# Patient Record
Sex: MALE | Race: ASIAN | URBAN - METROPOLITAN AREA
[De-identification: names, ages, dates, MRNs, and addresses within clinical notes are randomized per-mention and may not be internally consistent; named-entity substitution may affect disease eponyms.]

---

## 2021-08-20 ENCOUNTER — TELEPHONE (OUTPATIENT)
Dept: INFECTIOUS DISEASES | Age: 71
End: 2021-08-20

## 2021-08-20 ENCOUNTER — VIRTUAL VISIT (OUTPATIENT)
Dept: INFECTIOUS DISEASES | Age: 71
End: 2021-08-20
Payer: MEDICARE

## 2021-08-20 DIAGNOSIS — D69.6 THROMBOCYTOPENIA (HCC): ICD-10-CM

## 2021-08-20 DIAGNOSIS — C79.9 METASTASIS FROM MALIGNANT NEOPLASM OF LEFT KIDNEY (HCC): ICD-10-CM

## 2021-08-20 DIAGNOSIS — K21.9 GASTROESOPHAGEAL REFLUX DISEASE WITHOUT ESOPHAGITIS: ICD-10-CM

## 2021-08-20 DIAGNOSIS — D63.0: ICD-10-CM

## 2021-08-20 DIAGNOSIS — R06.02 SHORTNESS OF BREATH: ICD-10-CM

## 2021-08-20 DIAGNOSIS — D84.9 IMMUNOCOMPROMISED (HCC): ICD-10-CM

## 2021-08-20 DIAGNOSIS — Z90.5 H/O PARTIAL NEPHRECTOMY: ICD-10-CM

## 2021-08-20 DIAGNOSIS — R53.83 MALAISE AND FATIGUE: ICD-10-CM

## 2021-08-20 DIAGNOSIS — J90 RECURRENT RIGHT PLEURAL EFFUSION: Primary | ICD-10-CM

## 2021-08-20 DIAGNOSIS — C64.2 RENAL CELL CARCINOMA OF LEFT KIDNEY (HCC): ICD-10-CM

## 2021-08-20 DIAGNOSIS — C64.2 METASTASIS FROM MALIGNANT NEOPLASM OF LEFT KIDNEY (HCC): ICD-10-CM

## 2021-08-20 DIAGNOSIS — C64.1: ICD-10-CM

## 2021-08-20 DIAGNOSIS — Z87.891 EX-SMOKER: ICD-10-CM

## 2021-08-20 DIAGNOSIS — K76.9 CHRONIC LIVER DISEASE: ICD-10-CM

## 2021-08-20 DIAGNOSIS — R53.81 MALAISE AND FATIGUE: ICD-10-CM

## 2021-08-20 PROCEDURE — 4040F PNEUMOC VAC/ADMIN/RCVD: CPT | Performed by: INTERNAL MEDICINE

## 2021-08-20 PROCEDURE — 1123F ACP DISCUSS/DSCN MKR DOCD: CPT | Performed by: INTERNAL MEDICINE

## 2021-08-20 PROCEDURE — 3017F COLORECTAL CA SCREEN DOC REV: CPT | Performed by: INTERNAL MEDICINE

## 2021-08-20 PROCEDURE — 99205 OFFICE O/P NEW HI 60 MIN: CPT | Performed by: INTERNAL MEDICINE

## 2021-08-20 PROCEDURE — G8427 DOCREV CUR MEDS BY ELIG CLIN: HCPCS | Performed by: INTERNAL MEDICINE

## 2021-08-20 RX ORDER — TORSEMIDE 10 MG/1
10 TABLET ORAL DAILY
COMMUNITY
Start: 2021-08-19 | End: 2021-09-02

## 2021-08-20 RX ORDER — ALBUTEROL SULFATE 90 UG/1
2 AEROSOL, METERED RESPIRATORY (INHALATION) 3 TIMES DAILY
COMMUNITY
Start: 2021-07-14

## 2021-08-20 RX ORDER — SPIRONOLACTONE 50 MG/1
50 TABLET, FILM COATED ORAL DAILY
COMMUNITY
Start: 2021-01-12 | End: 2021-09-02

## 2021-08-20 RX ORDER — AMOXICILLIN AND CLAVULANATE POTASSIUM 875; 125 MG/1; MG/1
1 TABLET, FILM COATED ORAL 2 TIMES DAILY
COMMUNITY
Start: 2021-08-19 | End: 2021-08-26

## 2021-08-20 ASSESSMENT — ENCOUNTER SYMPTOMS
SHORTNESS OF BREATH: 1
SORE THROAT: 0
EYE DISCHARGE: 0
WHEEZING: 0
CONSTIPATION: 0
COUGH: 0
DIARRHEA: 0
EYE REDNESS: 0
ABDOMINAL PAIN: 0
RHINORRHEA: 0
BACK PAIN: 0
NAUSEA: 0
TROUBLE SWALLOWING: 0

## 2021-08-20 NOTE — TELEPHONE ENCOUNTER
Received medical records and referral for Dr. Kiera Hoyt ( in media tab )   Patient scheduled next available, 9/2/21, is this date and time appropriate for this patient

## 2021-08-20 NOTE — PROGRESS NOTES
[facility]fectious Diseases [facility] NewPatient Note    Reason for Visit:               Urgent consultation for pneumonia  Primary Care Physician:  No primary care provider on file. History Obtained From:   Patient , Medical Records     CHIEF COMPLAINT:    Chief Complaint   Patient presents with    New Patient     bacterial pneumonia ? HISTORY OF PRESENT ILLNESS: Cesario Ahumada is a 78 y.o. pleasant male patient, who had a virtual visit with me today as a new patient. History was obtained from chart review and the patient. The patient had a virtual visit with me today for above mentioned complaints. The patient history of bilateral renal cell carcinoma and has undergone bilateral partial nephrectomies in the past.  He was found to have recurrence of the renal cell carcinoma in September 2020 on a CT scan that indicated lesions in the left kidney and left adrenal gland and he was started on Keytruda by his oncologist.    He had right-sided pleural effusion for which he underwent thoracentesis on 3/59/9698, which was complicated by iatrogenic pneumothorax. The patient has been on repeated courses of antibiotics. The patient was seen by nurse practitioner Domitila Delcid at [facility] yesterday. He reported feeling poorly and was feeling fatigued and tired. He was on levofloxacin, which she was not tolerating well. The patient was referred to me for an urgent evaluation for concern for pneumonia and was given an urgent video appointment today. Past Medical History:   Past medical  history wasreviewed by me during this visit in detail. No past medical history on file. Past Surgical History:  Past surgical history was reviewed by me during this visit in detail. No past surgical history on file. Current Medications:    All medicationswere reviewed by me during this visit  Current Outpatient Medications   Medication Sig Dispense Refill    spironolactone (ALDACTONE) 50 MG tablet Take 50 mg by mouth daily      amoxicillin-clavulanate (AUGMENTIN) 875-125 MG per tablet Take 1 tablet by mouth 2 times daily      torsemide (DEMADEX) 10 MG tablet Take 10 mg by mouth daily      albuterol sulfate  (90 Base) MCG/ACT inhaler Inhale 2 puffs into the lungs 3 times daily       No current facility-administered medications for this visit. Family history: All family history was reviewed by me today    No family history on file. No family history of immunocompromising or autoimmune conditions. No h/o TBin in family or contacts      REVIEW OF SYSTEMS:      Review of Systems   Constitutional: Positive for fatigue. Negative for chills, diaphoresis and fever. HENT: Negative for ear discharge, ear pain, rhinorrhea, sore throat and trouble swallowing. Eyes: Negative for discharge and redness. Respiratory: Positive for shortness of breath (Mild to moderate). Negative for cough and wheezing. Cardiovascular: Negative for chest pain and leg swelling. Gastrointestinal: Negative for abdominal pain, constipation, diarrhea and nausea. Endocrine: Negative for polyuria. Genitourinary: Negative for dysuria, flank pain, frequency, hematuria and urgency. Musculoskeletal: Negative for back pain and myalgias. Skin: Negative for rash. Neurological: Negative for dizziness, seizures and headaches. Hematological: Does not bruise/bleed easily. Psychiatric/Behavioral: Negative for hallucinations and suicidal ideas. All other systems reviewed and are negative. PHYSICAL EXAM:      There were no vitals filed for this visit. Physical Exam  Constitutional:       General: He is not in acute distress. Appearance: Normal appearance. He is not ill-appearing or toxic-appearing. Comments: The patient was seen today via virtual video visit    Thin built. HENT:      Head: Normocephalic.       Right Ear: External ear normal.      Left Ear: External ear normal. Nose: Nose normal.      Mouth/Throat:      Pharynx: No oropharyngeal exudate. Eyes:      General: No scleral icterus. Right eye: No discharge. Left eye: No discharge. Extraocular Movements: Extraocular movements intact. Pulmonary:      Effort: Pulmonary effort is normal.   Musculoskeletal:         General: No swelling. Normal range of motion. Cervical back: Normal range of motion. Skin:     Coloration: Skin is not jaundiced. Findings: No bruising or erythema. Neurological:      Mental Status: He is alert and oriented to person, place, and time. Mental status is at baseline. Gait: Gait normal.   Psychiatric:         Mood and Affect: Mood normal.         Behavior: Behavior normal.         Thought Content: Thought content normal.         Judgment: Judgment normal.               DATA:  All available lab data was reviewed by me during this visit    Last CBC:  No results found for: WBC, HGB, HCT, MCV, PLT, LABLYMP, MID, GRAN, LYMPHOPCT, MIDPERCENT, GRANULOCYTES, RBC, MCH, MCHC, RDW    Last BMP:  No results found for: NA, K, CL, CO2, BUN, CREATININE, GLUCOSE, CALCIUM     Hepatic Function Panel: No results found for: ALKPHOS, ALT, AST, PROT, BILITOT, BILIDIR, IBILI, LABALBU    Last CK: No results found for: CKTOTAL    Last ESR:  No results found for: SEDRATE    Last CRP:  No results found for: CRP    1. Imaging: All pertinent images and reports for the current visit were reviewed by me during this visit. Outside records:    Labs, Microbiology, Radiology and pertinentresults from Care everywhere, if available, were reviewed as a part of the consultation. Problem list:       There is no problem list on file for this patient.       Microbiology:       All available micro data was reviewed by me during this visit      · Right pleural fluid culture - collected on 7/28/2021 at [id number]: Negative    Right pleural fluid cytology: Collected on [employer] Services:     Active Member of Clubs or Organizations:     Attends Club or Organization Meetings:     Marital Status:    Intimate Partner Violence:     Fear of Current or Ex-Partner:     Emotionally Abused:     Physically Abused:     Sexually Abused:        COVID VACCINATION AND LAB RESULT RECORDS:     Internal Administration   First Dose      Second Dose           Last COVID Lab No results found for: SARS-COV-2, SARS-COV-2 RNA, SARS-COV-2, SARS-COV-2, SARS-COV-2 BY PCR, SARS-COV-2, SARS-COV-2, SARS-COV-2         IMPRESSION:    The patient is a 78 y. o.old male who  has no past medical history on file. was seen today for following problems:    1. Recurrent right pleural effusion    2. Renal cell carcinoma of left kidney (HCC)    3. 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 Immunocompromised ([facility]    5. Metastasis from malignant neoplasm of left kidney (HCC)    6. Malaise and fatigue    7. Shortness of breath    8. Chronic liver disease    9. Thrombocytopenia ([facility]    10. H/O partial nephrectomy    11. Gastroesophageal reflux disease without esophagitis    12. Ex-smoker        Discussion:      I reviewed patient's medical record in detail. He is currently retired and used to work as a manager at [employer]. He has quit smoking in 1976. The patient has history of metastatic neoplasm of right renal cell carcinoma and underwent right partial nephrectomy in September 2016. He was then diagnosed with a left-sided renal cell carcinoma and he underwent left partial nephrectomy in March 2019. 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     Patient has been on surveillance by oncology since then and had CT scans done in September 2020 which showed a mass in the lower pole of the left kidney concerning for recurrent disease as well as interval growth of a mass involving the left adrenal gland suggestive of metastatic involvement with several nodules in the left lower lobe. The patient was started on Keytruda every 3 weeks by oncology and has received 4 cycles of Keytruda since then. The patient was also planned to be started on axinitib but could not start that due to financial issues. The patient also has chronic liver issues and has chronic thrombocytopenia. The patient has right-sided pleural effusion underwent ultrasound-guided thoracentesis at [facility] on 7/28/2021 and 2.5 L of fluid was drained. His repeat chest x-ray on 7/29/2021 showed right-sided pneumothorax. The cytology was negative for malignancy and showed chronic inflammatory cells. The patient tells me that he was given a 10-day course of levofloxacin before the thoracentesis was done by Dr. Brea Beltrán and after thoracentesis, he was given another 7 days of levofloxacin. I reviewed reports of all of the imaging studies from outside hospitals as well as a culture and cytology results in detail. Patient's chest x-ray on 7/30/2021 had shown a small echogenic right-sided pneumothorax with a persistent right lower lobe opacity. However, there chest x-ray done on 8/11/2021 showed a reflection of moderate right-sided pleural effusion and the chest x-ray done yesterday at [facility] indicated persistent moderate right-sided pleural effusion with right to basal atelectasis/pneumonia. The pneumothorax had resolved on the x-ray done yesterday. The pleural fluid cytology from 7/28/2021 was negative for malignant cells and showed benign mesothelial cells and chronic inflammatory cells. The pleural fluid Gram stain on 7/28/2021 had shown few WBCs but no organisms and the culture remained negative. No AFB or fungal culture or cytology available for chemistry available from the paracentesis fluid from 7/28/2021    I am not sure if the right pleural effusion represented a parapneumonic effusion or empyema or it was just a reactive effusion.   Since the patient had received a 10-day course of levofloxacin before thoracentesis, it would have significantly compromised the culture yield     Recollection of the right pleural effusion is concerning more for reactive effusion. Possibility of malignant effusion still cannot be ruled out. The patient denies having any fevers or night sweats. He does have some shortness of breath issues on exertion. He tells me that he has been given a 1 week prescription of oral Augmentin by his PCP yesterday    RECOMMENDATIONS:      Diagnostic Workup:    · If the patient needs repeat thoracentesis on the right side, send pleural fluid for glucose, protein, LDH, cholesterol with corresponding serum values  · Also, recommended sending right-sided pleural effusion fluid for Gram stain and bacterial culture, AFB stain and culture and fungal stain and culture and pleural fluid cytology  · Continue to follow fever curve at home. Antimicrobials:    · My recommendation will be to not start the Augmentin and have advised him to watch his symptoms off antibiotics.   · If the patient starts having any fevers or night sweats or he starts having worsening respiratory symptoms, my recommendation will be doing blood cultures and also doing a repeat right-sided thoracentesis  · Giving him a Augmentin this time before a potential thoracentesis will again compromise the culture yield and likely will not be very helpful to know the underlying cause of the effusions, which may keep on regulating  · If repeat right-sided thoracentesis done, will recommend doing the labs mentioned above on the thoracentesis fluid  · Continue to watch for any worsening shortness of breath  · He was advised to contact me if he does get repeat thoracentesis for any antibiotic recommendations  · In the meanwhile, if the patient starts getting sicker or starts having weakness or dizziness episodes of high-grade fevers, I have recommended him to go to the ER for evaluation and thoracentesis  · Discussed with patient the strategies to stay safe from COVID 19 exposures including safe social distancing, frequent and proper hand hygiene when outside and using 3 layered mouth and nose coverings/facemasks when outside their home. Patient education and counseling:    · The patient was educated in detail about the side-effects of variousantibiotics and things to watch for like new rashes, lip swelling, severe reaction, worsening diarrhea, break through fever etc.  · Patient was instructed to stop antibiotics and callour office if these problems were to occur, or go to nearest ER if experiencing severe symptoms. · Discussed patient's condition and what to expect. All of the patient's questions wereaddressed in a satisfactory manner and patient verbalized understanding all instructions. Level of complexity of consult: High    Follow-up:    · Follow-up with me in ID clinic or through video visit as needed    Meet Ambriz is a 78 y.o. male being evaluated by a Virtual Visit encounter to address concerns as mentioned above. A caregiver was present when appropriate. Due to this being a TeleHealth encounter ([facility]), evaluation of the following organ systems was limited: Vitals/Constitutional/EENT/Resp/CV/GI//MS/Neuro/Skin/Heme-Lymph-Imm. Pursuant to the emergency declaration under the [address] [place] [address], [address] [place] [address] authority and the [employer] Act, this Virtual Visit was conducted with patient's (and/or legal guardian's) consent, to reduce the patient's risk of exposure to COVID-19 and provide necessary medical care. The patient (and/or legal guardian) has also been advised to contact this office for worsening conditions or problems, and seek emergency medical treatment and/or call 911 if deemed necessary.      Services were provided through an audio and/or video synchronous discussion virtually to substitute for in-person clinic visit. Patient and provider were located at their individual homes. --Wilson Thurman MD on 8/20/2021 at 4:24 PM    An electronic signature was used to authenticate this note. TIME SPENTTODAY:     - Spent over 62 minutes on visit (including interval history, physical exam, review of data including labs, cultures, imaging, development and implementation of treatmentplan and coordination of care). - Over 50% of time spent with pt counseling and education. Please note that this chart was generated using Dragon dictation software. Although every effort was made to ensure the accuracy of this automated transcription, some errors in transcription may have occurred inadvertently. If you may need any clarification, please do not hesitate to contact me through [employer] or at the phone number provided below with my electronic signature. Any pictures or media included in this note were obtained after taking informed verbal consent from the patient and with their approval to include those in the patient's medical record. Thankyou for involving me in the care of your patient. If you have any additional questions,please do not hesitate to contact me.     Wilson Thurman MD, MPH  8/20/21 , 3:29 PM EDT   [facility]   [address] [place] [address] [address] [facility], [address] [place] [address]  Office: [phone]  Fax: [phone]  Clinic days:  Tuesday & Thursday

## 2021-08-24 ENCOUNTER — TELEPHONE (OUTPATIENT)
Dept: INFECTIOUS DISEASES | Age: 71
End: 2021-08-24

## 2021-08-24 NOTE — TELEPHONE ENCOUNTER
I also recommend the following:    Pleural fluid  glucose, protein, LDH, cholesterol and fluid cell count with differential. Do the same labs from the blood. Please let them know. Thanks.

## 2021-08-24 NOTE — TELEPHONE ENCOUNTER
Received call from Ching Blake w/ Dr. Faheem Heredia office stating that pt was told that he needed fluid drawn for cx's when he was at his appt w/ Dr. Gordy Galo on 8/22. Ching Blake states that some orders were placed by their office but wanted to confirm with Dr. Gordy Galo that the test they ordered were correct and see if Dr. Gordy Galo wanted additional test. Cx's that were drawn were the following:  -Non gyn Cytology  -Cx Anaerobic fluid  -Cx AFB w/ AFB Strain  -Cx Fungal non skin  -Cx Wound w/gram stain  -Fungus smear    Please let Ching Blake know if additional cx's are needed.   [phone]

## 2021-08-26 ENCOUNTER — TELEPHONE (OUTPATIENT)
Dept: INFECTIOUS DISEASES | Age: 71
End: 2021-08-26

## 2021-08-26 NOTE — TELEPHONE ENCOUNTER
If the patient is having any fevers or increasing weakness, okay to start Augmentin. Otherwise, let us wait for the pleural fluid results.   Please let him know and please schedule him for a video visit sometime next week

## 2021-08-26 NOTE — TELEPHONE ENCOUNTER
Pt called in to see if he needs to restart his Augmentin since he had his thoracentesis done today.  Please call pt at [phone]

## 2021-09-02 ENCOUNTER — VIRTUAL VISIT (OUTPATIENT)
Dept: INFECTIOUS DISEASES | Age: 71
End: 2021-09-02
Payer: MEDICARE

## 2021-09-02 DIAGNOSIS — Z90.5 H/O PARTIAL NEPHRECTOMY: ICD-10-CM

## 2021-09-02 DIAGNOSIS — Z87.891 EX-SMOKER: ICD-10-CM

## 2021-09-02 DIAGNOSIS — R53.81 MALAISE AND FATIGUE: ICD-10-CM

## 2021-09-02 DIAGNOSIS — K76.9 CHRONIC LIVER DISEASE: ICD-10-CM

## 2021-09-02 DIAGNOSIS — K21.9 GASTROESOPHAGEAL REFLUX DISEASE WITHOUT ESOPHAGITIS: ICD-10-CM

## 2021-09-02 DIAGNOSIS — J90 RECURRENT RIGHT PLEURAL EFFUSION: Primary | ICD-10-CM

## 2021-09-02 DIAGNOSIS — C64.2 METASTASIS FROM MALIGNANT NEOPLASM OF LEFT KIDNEY (HCC): ICD-10-CM

## 2021-09-02 DIAGNOSIS — R53.83 MALAISE AND FATIGUE: ICD-10-CM

## 2021-09-02 DIAGNOSIS — D84.9 IMMUNOCOMPROMISED (HCC): ICD-10-CM

## 2021-09-02 DIAGNOSIS — D69.6 THROMBOCYTOPENIA (HCC): ICD-10-CM

## 2021-09-02 DIAGNOSIS — C79.9 METASTASIS FROM MALIGNANT NEOPLASM OF LEFT KIDNEY (HCC): ICD-10-CM

## 2021-09-02 PROCEDURE — 1123F ACP DISCUSS/DSCN MKR DOCD: CPT | Performed by: INTERNAL MEDICINE

## 2021-09-02 PROCEDURE — G8427 DOCREV CUR MEDS BY ELIG CLIN: HCPCS | Performed by: INTERNAL MEDICINE

## 2021-09-02 PROCEDURE — 3017F COLORECTAL CA SCREEN DOC REV: CPT | Performed by: INTERNAL MEDICINE

## 2021-09-02 PROCEDURE — 4040F PNEUMOC VAC/ADMIN/RCVD: CPT | Performed by: INTERNAL MEDICINE

## 2021-09-02 PROCEDURE — 99213 OFFICE O/P EST LOW 20 MIN: CPT | Performed by: INTERNAL MEDICINE

## 2021-09-02 NOTE — PROGRESS NOTES
Infectious Diseases Oupatient Follow-up Note            Reason for Visit:               Recurrent right pleural effusion  Primary Care Physician:  No primary care provider on file. History Obtained From:   Patient , Medical Records     CHIEF COMPLAINT:    Chief Complaint   Patient presents with    Follow-up       INTERVAL HISTORY: Iain Rowe is a 78 y.o. pleasant male patient, who had a virtual visit with me today for follow-up. History was obtained from chart review and the patient. The patient had a virtual visit with me today for above mentioned complaints. The patient had establish care with me for recurrent right pleural effusions. He has history of bilateral renal cell cancer and has had bilateral nephrectomies in the past.    He was noted to have a recurrent renal cell carcinoma in September 2020 that was noted on CT scan and was found to have lesions in the left kidney and the left adrenal gland and was treated on Keytruda. The patient has taken multiple courses of empiric antibiotics. He had ultrasound-guided thoracentesis done on the right side on 8/26/2021 at [facility]        Past Medical History:   Past medical and surgical history was reviewed by me during this visit in detail. No past medical history on file. Past Surgical History:    No past surgical history on file. Current Medications: All medications were reviewed by me during this visit  Current Outpatient Medications   Medication Sig Dispense Refill    albuterol sulfate  (90 Base) MCG/ACT inhaler Inhale 2 puffs into the lungs 3 times daily       No current facility-administered medications for this visit. Family history: All family history was reviewed by me today    No family history on file. No family history of immunocompromising or autoimmune conditions.  No h/o TB in in family or contacts    REVIEW OF SYSTEMS:      Review of Systems   Constitutional: Negative for chills, diaphoresis and fever. HENT: Negative for ear discharge, ear pain, rhinorrhea, sore throat and trouble swallowing. Eyes: Negative for discharge and redness. Respiratory: Negative for cough, shortness of breath and wheezing. Cardiovascular: Negative for chest pain and leg swelling. Gastrointestinal: Negative for abdominal pain, constipation, diarrhea and nausea. Endocrine: Negative for polyuria. Genitourinary: Negative for dysuria, flank pain, frequency, hematuria and urgency. Musculoskeletal: Negative for back pain and myalgias. Skin: Negative for rash. Neurological: Negative for dizziness, seizures and headaches. Hematological: Does not bruise/bleed easily. Psychiatric/Behavioral: Negative for hallucinations and suicidal ideas. All other systems reviewed and are negative. PHYSICAL EXAM:      There were no vitals filed for this visit. Physical Exam  Constitutional:       General: He is not in acute distress. Appearance: Normal appearance. He is not ill-appearing or toxic-appearing. Comments: The patient was seen today via virtual video visit   HENT:      Head: Normocephalic. Right Ear: External ear normal.      Left Ear: External ear normal.      Nose: Nose normal.      Mouth/Throat:      Pharynx: No oropharyngeal exudate. Eyes:      General: No scleral icterus. Right eye: No discharge. Left eye: No discharge. Extraocular Movements: Extraocular movements intact. Pulmonary:      Effort: Pulmonary effort is normal.   Musculoskeletal:         General: No swelling. Normal range of motion. Cervical back: Normal range of motion. Skin:     Coloration: Skin is not jaundiced. Findings: No bruising or erythema. Neurological:      Mental Status: He is alert and oriented to person, place, and time. Mental status is at baseline.       Gait: Gait normal.   Psychiatric:         Mood and Affect: Mood normal.         Behavior: Behavior normal. Thought Content: Thought content normal.         Judgment: Judgment normal.             DATA:  All available lab data wasreviewed by me during this visit    CBC:  No results found for: WBC, HGB, HCT, MCV, PLT, LABLYMP, MID, GRAN, LYMPHOPCT, MIDPERCENT, GRANULOCYTES, RBC, MCH, MCHC, RDW    Last BMP:  No results found for: NA, K, CL, CO2, BUN, CREATININE, GLUCOSE, CALCIUM     Hepatic Function Panel:No results found for: ALKPHOS, ALT, AST, PROT, BILITOT, BILIDIR, IBILI, LABALBU    Last CK: No results found for: CKTOTAL    Last ESR:  No results found for: SEDRATE    Last CRP:  No results found for: CRP      Imaging: All pertinent images and reports for the current visit were reviewed by me during this visit. Outside records:    Labs, Microbiology,Radiology and pertinent results from Care everywhere, if available, were reviewed as a part of the consultation. Problem list:       There is no problem list on file for this patient. Microbiology:       All available micro data was reviewed by me during this visit    Allergies and immunizations:       Known drug Allergies: All allergies data was reviewed by me during this visit  Patient has no known allergies. Immunizations: All immunizations were reviewed byme in detail. Immunization History   Administered Date(s) Administered    COVID-19, Moderna, PF, 100mcg/0.5mL 2021       SocialHistory:  All social and epidemiologic history was reviewed and updated be me in detail today.     Social History     Socioeconomic History    Marital status:      Spouse name: None    Number of children: None    Years of education: None    Highest education level: None   Occupational History    None   Tobacco Use    Smoking status: Former Smoker     Start date:      Quit date:      Years since quittin.7    Smokeless tobacco: Never Used   Substance and Sexual Activity    Alcohol use: None    Drug use: None    Sexual activity: None   Other Topics Concern    None   Social History Narrative    None     Social Determinants of Health     Financial Resource Strain:     Difficulty of Paying Living Expenses:    Food Insecurity:     Worried About Running Out of Food in the Last Year:     [address] in the Last Year:    Transportation Needs:     Lack of Transportation (Medical):  Lack of Transportation (Non-Medical):    Physical Activity:     Days of Exercise per Week:     Minutes of Exercise per Session:    Stress:     Feeling of Stress :    Social Connections:     Frequency of Communication with Friends and Family:     Frequency of Social Gatherings with Friends and Family:     Attends [employer] Services:     Active Member of Clubs or Organizations:     Attends Club or Organization Meetings:     Marital Status:    Intimate Partner Violence:     Fear of Current or Ex-Partner:     Emotionally Abused:     Physically Abused:     Sexually Abused:        COVID VACCINATION AND LAB RESULT RECORDS:     Internal Administration   First Dose COVID-19, Moderna, PF, 100mcg/0.5mL  04/14/2021   Second Dose           Last COVID Lab No results found for: SARS-COV-2, SARS-COV-2 RNA, SARS-COV-2, SARS-COV-2, SARS-COV-2 BY PCR, SARS-COV-2, SARS-COV-2, SARS-COV-2         IMPRESSION:    The patient is a 78 y.o. old male who  has no past medical history on file. was seen today for following problems:    1. Recurrent right pleural effusion    2. Immunocompromised ([address]    3. 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 Malaise and fatigue    5. Chronic liver disease    6. Thrombocytopenia ([address]    7. Gastroesophageal reflux disease without esophagitis    8. Ex-smoker    9. H/O partial nephrectomy        Discussion:      The patient has taken oral Augmentin for 5 days after his last thoracentesis which was done on 8/26/2021. He is doing well. His pulmonologist has already stopped his antibiotics.     I reviewed the records of her pleural fluid cultures from 8/26/2021 from [facility] through [facility]. The Gram stain and cultures were negative. The fungal stain was negative and the cytology showed benign mesothelial cells with chronic inflammatory cells and was negative for malignancy      RECOMMENDATIONS:      Diagnostic Workup:    · No further infectious disease work-up needed  · Continue to follow fever curve  at home      Antimicrobials:    · Agree with stopping antibiotics  · I do not think further antibiotics are needed at this time. No evidence of any infectious process  · The patient tells me that he has will be started on Keytruda  · Continue to watch for any recurrence of the pleural fluid collection. Unclear if pleural fluid collections have been secondary to [facility]  · Continue to watch for new fever or night sweats  · Discussed with patient the strategies to stay safe from COVID 19 exposures including safe social distancing, frequent and proper hand hygiene when outside and using 3 layered mouth and nose coverings/facemasks when outside their home. Patient education and counseling:    · The patient was educated in detail about the side-effects of various antibiotics and things to watch for like new rashes, lip swelling, severereaction, worsening diarrhea, break through fever etc.  · Patient was instructed to stop antibiotics and call our office if these problems were to occur, or go to nearest ER ifexperiencing severe symptoms. · Discussed patient's condition and what to expect. All of the patient's questions were addressed in a satisfactory manner and patient verbalizedunderstanding all instructions. Follow-up:    · Follow-up with me in ID clinic or through video visit on as-needed basis    Blu Up is a 78 y.o. male being evaluated by a Virtual Visit encounter to address concerns as mentioned above. A caregiver was present when appropriate.  Due to this being a TeleHealth encounter (During VJPIO-31 public health emergency), evaluation of the following organ systems was limited: Vitals/Constitutional/EENT/Resp/CV/GI//MS/Neuro/Skin/Heme-Lymph-Imm. Pursuant to the emergency declaration under the [facility], [address] [place] [address] authority and the [employer] Act, this Virtual Visit was conducted with patient's (and/or legal guardian's) consent, to reduce the patient's risk of exposure to COVID-19 and provide necessary medical care. The patient (and/or legal guardian) has also been advised to contact this office for worsening conditions or problems, and seek emergency medical treatment and/or call 911 if deemed necessary. Services were provided through an audio and/or video synchronous discussion virtually to substitute for in-person clinic visit. Patient and provider were located at their individual homes. --Edmar Monroe MD on 9/3/2021 at 11:28 PM    An electronic signature was used to authenticate this note. TIME SPENT TODAY:    - Spent over 21 minutes on visit (including interval history, physical exam, review of data including labs,cultures, imaging, development and implementation of treatment plan and coordination of care). - Over 50% of time spent with pt counseling andeducation. Please note that this chart was generated using Dragon dictation software. Although every effort was made to ensure the accuracy of this automated transcription, some errors in transcription may have occurred inadvertently. If you may need any clarification, please do not hesitate to contact me through [employer] or at the phone number provided below with my electronic signature. Any pictures or media included in this note were obtained after taking informed verbal consent from the patient and with their approval to include those in the patient's medical record. Thankyou for involving me inthe care of your patient.   If you have any additional questions, please do not hesitate to contact me.     Odette Powell MD, MPH  9/2/21, 11:17 AM EDT   [facility] Infectious Disease   [address] [place] [address], [facility] [address] [facility], [address] [place] [address]  Office: [phone]  Fax: [phone]  Clinic days:  Tuesday & Thursday

## 2021-09-03 ASSESSMENT — ENCOUNTER SYMPTOMS
CONSTIPATION: 0
BACK PAIN: 0
COUGH: 0
EYE DISCHARGE: 0
EYE REDNESS: 0
WHEEZING: 0
SHORTNESS OF BREATH: 0
TROUBLE SWALLOWING: 0
NAUSEA: 0
DIARRHEA: 0
ABDOMINAL PAIN: 0
RHINORRHEA: 0
SORE THROAT: 0